# Patient Record
Sex: FEMALE | URBAN - METROPOLITAN AREA
[De-identification: names, ages, dates, MRNs, and addresses within clinical notes are randomized per-mention and may not be internally consistent; named-entity substitution may affect disease eponyms.]

---
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
## 2025-06-05 NOTE — PROCEDURE: ADDITIONAL NOTES
No
Detail Level: Zone
Additional Notes: Patient to decrease dose to 30 mg once daily. If side effects do not improve in the next 5-7 days or any new/worsening symptoms develop, she is to stop medication.